# Patient Record
Sex: FEMALE | Race: BLACK OR AFRICAN AMERICAN | NOT HISPANIC OR LATINO | URBAN - METROPOLITAN AREA
[De-identification: names, ages, dates, MRNs, and addresses within clinical notes are randomized per-mention and may not be internally consistent; named-entity substitution may affect disease eponyms.]

---

## 2024-03-27 ENCOUNTER — HOSPITAL ENCOUNTER (EMERGENCY)
Facility: HOSPITAL | Age: 24
Discharge: HOME | End: 2024-03-27
Attending: EMERGENCY MEDICINE
Payer: MEDICAID

## 2024-03-27 VITALS
DIASTOLIC BLOOD PRESSURE: 58 MMHG | SYSTOLIC BLOOD PRESSURE: 116 MMHG | BODY MASS INDEX: 22.63 KG/M2 | WEIGHT: 123 LBS | RESPIRATION RATE: 18 BRPM | TEMPERATURE: 99 F | OXYGEN SATURATION: 100 % | HEIGHT: 62 IN | HEART RATE: 75 BPM

## 2024-03-27 DIAGNOSIS — S00.452A EMBEDDED EARRING OF LEFT EAR, INITIAL ENCOUNTER: Primary | ICD-10-CM

## 2024-03-27 LAB
B-HCG UR QL: NEGATIVE
CTP QC/QA: YES

## 2024-03-27 PROCEDURE — 25000003 PHARM REV CODE 250: Performed by: NURSE PRACTITIONER

## 2024-03-27 PROCEDURE — 99283 EMERGENCY DEPT VISIT LOW MDM: CPT

## 2024-03-27 PROCEDURE — 81025 URINE PREGNANCY TEST: CPT

## 2024-03-27 RX ORDER — MUPIROCIN 20 MG/G
OINTMENT TOPICAL 3 TIMES DAILY
Qty: 15 G | Refills: 0 | Status: SHIPPED | OUTPATIENT
Start: 2024-03-27

## 2024-03-27 RX ORDER — MUPIROCIN 20 MG/G
1 OINTMENT TOPICAL
Status: COMPLETED | OUTPATIENT
Start: 2024-03-27 | End: 2024-03-27

## 2024-03-27 RX ORDER — LIDOCAINE HYDROCHLORIDE 10 MG/ML
5 INJECTION INFILTRATION; PERINEURAL
Status: COMPLETED | OUTPATIENT
Start: 2024-03-27 | End: 2024-03-27

## 2024-03-27 RX ADMIN — LIDOCAINE HYDROCHLORIDE 5 ML: 10 INJECTION, SOLUTION INFILTRATION; PERINEURAL at 04:03

## 2024-03-27 RX ADMIN — MUPIROCIN 1 TUBE: 20 OINTMENT TOPICAL at 05:03

## 2024-03-27 NOTE — DISCHARGE INSTRUCTIONS

## 2024-03-27 NOTE — ED PROVIDER NOTES
Encounter Date: 3/27/2024    SCRIBE #1 NOTE: I, Anikaarmen Uriarte, am scribing for, and in the presence of,  Jose Ramon Carr NP. I have scribed the following portions of the note - Other sections scribed: HPI, ROS.       History     Chief Complaint   Patient presents with    Otalgia     Pt co of earring stuck in her L ear x 1 month. States the skin has started to grow over it. Denies any drainage.     23 y. o. female with no pertinent PMHx presents to the ED for a chief complaint of left ear pain s/p earring stuck in her left ear happened a month and half ago. Patient reports the skin has started to grow over it. Further reports she tried to take it out by herself multiple times but she couldn't due to the pain. Patient also reports she is concerning about possible infection to the area, prompting her to the ED for an evaluation. No attempted Tx for the Sx. No other alleviating or exacerbating factors. Denies any drainage, or other associated symptoms. NKDA.     The history is provided by the patient. No  was used.     Review of patient's allergies indicates:  No Known Allergies  No past medical history on file.  No past surgical history on file.  No family history on file.     Review of Systems   Constitutional:  Negative for chills, fatigue and fever.   HENT:  Positive for ear pain (L ear). Negative for congestion, nosebleeds, postnasal drip, rhinorrhea, sinus pressure and sore throat.    Eyes:  Negative for photophobia and pain.   Respiratory:  Negative for apnea, cough, choking, chest tightness, shortness of breath, wheezing and stridor.    Cardiovascular:  Negative for chest pain, palpitations and leg swelling.   Gastrointestinal:  Negative for abdominal pain, constipation, diarrhea, nausea and vomiting.   Genitourinary:  Negative for dysuria, flank pain, hematuria, pelvic pain and vaginal discharge.   Musculoskeletal:  Negative for arthralgias, back pain, gait problem and neck pain.    Skin:  Negative for color change, pallor, rash and wound.        (-) no drainage to the area   Neurological:  Negative for dizziness, seizures, syncope, facial asymmetry, light-headedness and headaches.   Hematological:  Negative for adenopathy.   Psychiatric/Behavioral:  Negative for confusion. The patient is not nervous/anxious.        Physical Exam     Initial Vitals [03/27/24 1557]   BP Pulse Resp Temp SpO2   (!) 116/58 75 18 99.1 °F (37.3 °C) 100 %      MAP       --         Physical Exam    Nursing note and vitals reviewed.  Constitutional: She appears well-developed and well-nourished. She is not diaphoretic. No distress.   HENT:   Head: Normocephalic and atraumatic.   Right Ear: External ear normal.   Left Ear: External ear normal.   Nose: Nose normal.   Earring imbedded in left earlobe   Eyes: Conjunctivae and EOM are normal. Right eye exhibits no discharge. Left eye exhibits no discharge.   Neck: Neck supple. No tracheal deviation present.   Normal range of motion.  Cardiovascular:  Normal rate.           Pulmonary/Chest: No stridor. No respiratory distress.   Abdominal: Abdomen is soft. She exhibits no distension. There is no abdominal tenderness.   Musculoskeletal:         General: No tenderness. Normal range of motion.      Cervical back: Normal range of motion and neck supple.     Neurological: She is alert and oriented to person, place, and time. She has normal strength. No cranial nerve deficit or sensory deficit.   Skin: Skin is warm and dry.   Psychiatric: She has a normal mood and affect. Her behavior is normal. Judgment and thought content normal.         ED Course   Foreign Body    Date/Time: 3/27/2024 5:11 PM    Performed by: Jose Ramon Carr NP  Authorized by: Guera Elliott MD  Consent Done: Yes  Consent: Verbal consent obtained.  Risks and benefits: risks, benefits and alternatives were discussed  Consent given by: patient  Required items: required blood products, implants, devices, and  "special equipment available  Patient identity confirmed: , name and verbally with patient  Time out: Immediately prior to procedure a "time out" was called to verify the correct patient, procedure, equipment, support staff and site/side marked as required.  Body area: skin  General location: head/neck  Location details: left external ear  Anesthesia: local infiltration    Anesthesia:  Local Anesthetic: lidocaine 1% without epinephrine  Anesthetic total: 1 mL    Patient sedated: no  Patient restrained: no  Patient cooperative: yes  Localization method: visualized  Dressing: antibiotic ointment  Tendon involvement: none  Depth: subcutaneous  Complexity: simple  2 objects recovered.  Objects recovered: front and back of earring  Post-procedure assessment: foreign body removed  Patient tolerance: Patient tolerated the procedure well with no immediate complications      Labs Reviewed   POCT URINE PREGNANCY          Imaging Results    None          Medications   LIDOcaine HCL 10 mg/ml (1%) injection 5 mL (5 mLs Infiltration Given by Provider 3/27/24 1621)   mupirocin 2 % ointment 1 Tube (1 Tube Topical (Top) Given 3/27/24 1704)     Medical Decision Making  Removed foreign body as above.  No evidence of skin infection.  No residual foreign bodies remain.  Applied antibiotic ointment.  Shared decision-making with the patient.    Amount and/or Complexity of Data Reviewed  Labs: ordered. Decision-making details documented in ED Course.    Risk  Prescription drug management.            Scribe Attestation:   Scribe #1: I performed the above scribed service and the documentation accurately describes the services I performed. I attest to the accuracy of the note.    Scribe attestation: I, Jose Ramon Carr NP, personally performed the services described in this documentation. All medical record entries made by the scribe were at my direction and in my presence.  I have reviewed the chart and agree that the record reflects my " personal performance and is accurate and complete.      ED Course as of 03/27/24 1714   Wed Mar 27, 2024   1610 BP(!): 116/58 [VC]   1610 Temp: 99.1 °F (37.3 °C) [VC]   1610 Temp Source: Oral [VC]   1610 Pulse: 75 [VC]   1610 Resp: 18 [VC]   1610 SpO2: 100 % [VC]      ED Course User Index  [VC] Jose Walker DNP                           Clinical Impression:  Final diagnoses:  [S00.452A] Embedded earring of left ear, initial encounter (Primary)          ED Disposition Condition    Discharge Stable          ED Prescriptions       Medication Sig Dispense Start Date End Date Auth. Provider    mupirocin (BACTROBAN) 2 % ointment Apply topically 3 (three) times daily. 15 g 3/27/2024 -- Jose Ramon Carr, NP          Follow-up Information       Follow up With Specialties Details Why Contact Info Additional Information    Dermatology, [facility]  Schedule an appointment as soon as possible for a visit in 1 week For further evaluation [address] [facility] [address]  [phone]       [facility] - Dermatology [address] [place] [address] Dermatology Schedule an appointment as soon as possible for a visit in 1 week For further evaluation [address] [facility] [address] [phone] Dermatology - Main Building, Clinic 11th Floor Please park in [facility]. Use Clinic elevators 12 & 13 to get to the 11th floor    [facility] - Emergency Dept Emergency Medicine Go to  If symptoms worsen, As needed [address] [facility] [place] [address] [phone]              Jose Ramon Carr, NP  03/27/24 1714